# Patient Record
Sex: FEMALE | Race: WHITE | NOT HISPANIC OR LATINO | Employment: UNEMPLOYED | URBAN - METROPOLITAN AREA
[De-identification: names, ages, dates, MRNs, and addresses within clinical notes are randomized per-mention and may not be internally consistent; named-entity substitution may affect disease eponyms.]

---

## 2022-07-15 ENCOUNTER — OFFICE VISIT (OUTPATIENT)
Dept: URGENT CARE | Facility: CLINIC | Age: 37
End: 2022-07-15
Payer: COMMERCIAL

## 2022-07-15 VITALS
OXYGEN SATURATION: 96 % | HEART RATE: 88 BPM | SYSTOLIC BLOOD PRESSURE: 110 MMHG | TEMPERATURE: 97.7 F | DIASTOLIC BLOOD PRESSURE: 62 MMHG | RESPIRATION RATE: 18 BRPM

## 2022-07-15 DIAGNOSIS — J02.0 STREP PHARYNGITIS: Primary | ICD-10-CM

## 2022-07-15 LAB — S PYO AG THROAT QL: POSITIVE

## 2022-07-15 PROCEDURE — 87880 STREP A ASSAY W/OPTIC: CPT | Performed by: PHYSICIAN ASSISTANT

## 2022-07-15 PROCEDURE — G0382 LEV 3 HOSP TYPE B ED VISIT: HCPCS | Performed by: PHYSICIAN ASSISTANT

## 2022-07-15 RX ORDER — AZITHROMYCIN 250 MG/1
TABLET, FILM COATED ORAL
Qty: 6 TABLET | Refills: 0 | Status: SHIPPED | OUTPATIENT
Start: 2022-07-15 | End: 2022-07-19

## 2022-07-15 RX ORDER — NORETHINDRONE AND ETHINYL ESTRADIOL AND FERROUS FUMARATE 0.8-25(24)
1 KIT ORAL DAILY
COMMUNITY
Start: 2022-06-15

## 2022-07-15 NOTE — PROGRESS NOTES
[facility]        NAME: Sd Holley is a 39 y o  female  : 1985    MRN: [id number]  DATE: 2022  TIME: 7:25 AM    Assessment and Plan   Strep pharyngitis [J02 0]  1  Strep pharyngitis  azithromycin (ZITHROMAX) 250 mg tablet    POCT rapid strepA         Patient Instructions       Follow up with PCP in 3-5 days  Proceed to  ER if symptoms worsen  Chief Complaint     Chief Complaint   Patient presents with    Sore Throat     Began Monday  Negative home COVID test Monday  Reports a sore throat, cough and headache  History of Present Illness       27year old female presents the clinic with sore throat pain that started Monday  Patient states that she took a COVID test at home on Monday which was negative  Patient reports a sore throat, cough, headache  Review of Systems   Review of Systems   Constitutional: Negative for chills and fever  HENT: Positive for sore throat  Negative for congestion, ear pain and rhinorrhea  Respiratory: Positive for cough  Negative for chest tightness and wheezing  Neurological: Positive for headaches  Current Medications       Current Outpatient Medications:     Norethin-Eth Estradiol-Fe 0 8-25 MG-MCG CHEW, Chew 1 tablet daily, Disp: , Rfl:     Current Allergies     Allergies as of 07/15/2022 - Reviewed 07/15/2022   Allergen Reaction Noted    Amoxicillin Rash 07/15/2022            The following portions of the patient's history were reviewed and updated as appropriate: allergies, current medications, past family history, past medical history, past social history, past surgical history and problem list      History reviewed  No pertinent past medical history  History reviewed  No pertinent surgical history  History reviewed  No pertinent family history  Medications have been verified  Objective   /62   Pulse 88   Temp 97 7 °F (36 5 °C)   Resp 18   SpO2 96%   No LMP recorded         Physical Exam Physical Exam  Vitals and nursing note reviewed  Constitutional:       General: She is not in acute distress  Appearance: She is well-developed  She is not diaphoretic  HENT:      Head: Normocephalic and atraumatic  Nose: Nose normal       Mouth/Throat:      Pharynx: Uvula midline  Posterior oropharyngeal erythema present  Comments: Rapid strep positive  Eyes:      Conjunctiva/sclera: Conjunctivae normal    Pulmonary:      Effort: Pulmonary effort is normal    Musculoskeletal:         General: Normal range of motion  Cervical back: Normal range of motion and neck supple  Lymphadenopathy:      Head:      Right side of head: Tonsillar adenopathy present  Left side of head: Tonsillar adenopathy present  Skin:     General: Skin is warm and dry  Findings: No rash  Neurological:      Mental Status: She is alert and oriented to person, place, and time

## 2022-07-15 NOTE — PATIENT INSTRUCTIONS
Drink plenty of fluids  Can take ibuprofen as needed for pain with meals to prevent stomach upset, nausea or vomiting and can take Tylenol in between doses of ibuprofen as needed  Take antibiotics as prescribed  Change or toothbrush after 2 days of antibiotic use  Do not share food or drink with others while being treated for strep throat  Follow up with family doctor if symptoms are not improving  Strep Throat   AMBULATORY CARE:   Strep throat  is a throat infection caused by bacteria  It is easily spread from person to person  Common symptoms include the following:   Sore, red, and swollen throat    Fever and headache     Upset stomach, abdominal pain, or vomiting    White or yellow patches or blisters in the back of your throat    Tender, swollen lumps on the sides of your neck or jaw    Throat pain when you swallow    Call 911 for any of the following: You have trouble breathing  Seek care immediately if:   You have new symptoms like a bad headache, stiff neck, chest pain, or vomiting  You are drooling because you cannot swallow your spit  Contact your healthcare provider if:   You have a fever  You have a rash or ear pain  You have green, yellow-brown, or bloody mucus when you cough or blow your nose  You are unable to drink anything  You have questions or concerns about your condition or care  Treatment for strep throat  may include antibiotic medicine to treat your strep throat  You should feel better within 2 to 3 days after you start antibiotics  You may return to work or school 24 hours after you start antibiotics  Manage strep throat:   Use lozenges, ice, soft foods, or popsicles  to soothe your throat  Drink juice, milk shakes, or soup  if your throat is too sore to eat solid food  Drinking liquids can also help prevent dehydration  Gargle with salt water  Mix ¼ teaspoon salt in a glass of warm water and gargle   This may help reduce swelling in your throat  Do not smoke  Nicotine and other chemicals in cigarettes and cigars can cause lung damage and make your symptoms worse  Ask your healthcare provider for information if you currently smoke and need help to quit  E-cigarettes or smokeless tobacco still contain nicotine  Talk to your healthcare provider before you use these products  Prevent the spread of strep throat:   Wash your hands often  Use soap and water  Wash your hands after you use the bathroom, change a child's diapers, or sneeze  Wash your hands before you prepare or eat food  Do not share food or drinks  Replace your toothbrush after you have taken antibiotics for 24 hours  Follow up with your doctor as directed:  Write down your questions so you remember to ask them during your visits  © Copyright [employer] 2022 Information is for End User's use only and may not be sold, redistributed or otherwise used for commercial purposes  All illustrations and images included in CareNotes® are the copyrighted property of [employer]  or [facility]   The above information is an  only  It is not intended as medical advice for individual conditions or treatments  Talk to your doctor, nurse or pharmacist before following any medical regimen to see if it is safe and effective for you